# Patient Record
Sex: MALE | Race: BLACK OR AFRICAN AMERICAN | URBAN - METROPOLITAN AREA
[De-identification: names, ages, dates, MRNs, and addresses within clinical notes are randomized per-mention and may not be internally consistent; named-entity substitution may affect disease eponyms.]

---

## 2023-02-19 PROBLEM — M25.511 ACUTE PAIN OF RIGHT SHOULDER: Status: ACTIVE | Noted: 2023-02-19

## 2023-02-19 PROBLEM — E66.3 OVERWEIGHT WITH BODY MASS INDEX (BMI) OF 26 TO 26.9 IN ADULT: Status: ACTIVE | Noted: 2023-02-19

## 2023-02-19 PROBLEM — R03.0 ELEVATED BP WITHOUT DIAGNOSIS OF HYPERTENSION: Status: ACTIVE | Noted: 2023-02-19

## 2023-04-05 ENCOUNTER — APPOINTMENT (OUTPATIENT)
Dept: PRIMARY CARE | Facility: CLINIC | Age: 33
End: 2023-04-05
Payer: COMMERCIAL

## 2023-04-25 ENCOUNTER — OFFICE VISIT (OUTPATIENT)
Dept: PRIMARY CARE | Facility: CLINIC | Age: 33
End: 2023-04-25
Payer: COMMERCIAL

## 2023-04-25 VITALS
BODY MASS INDEX: 29.17 KG/M2 | WEIGHT: 215.4 LBS | SYSTOLIC BLOOD PRESSURE: 132 MMHG | HEIGHT: 72 IN | OXYGEN SATURATION: 98 % | TEMPERATURE: 97.6 F | HEART RATE: 78 BPM | DIASTOLIC BLOOD PRESSURE: 98 MMHG

## 2023-04-25 DIAGNOSIS — Z11.3 SCREEN FOR STD (SEXUALLY TRANSMITTED DISEASE): Primary | ICD-10-CM

## 2023-04-25 PROCEDURE — 87661 TRICHOMONAS VAGINALIS AMPLIF: CPT

## 2023-04-25 PROCEDURE — 99213 OFFICE O/P EST LOW 20 MIN: CPT | Performed by: NURSE PRACTITIONER

## 2023-04-25 PROCEDURE — 87591 N.GONORRHOEAE DNA AMP PROB: CPT

## 2023-04-25 PROCEDURE — 87491 CHLMYD TRACH DNA AMP PROBE: CPT

## 2023-04-25 PROCEDURE — 3008F BODY MASS INDEX DOCD: CPT | Performed by: NURSE PRACTITIONER

## 2023-04-25 PROCEDURE — 1036F TOBACCO NON-USER: CPT | Performed by: NURSE PRACTITIONER

## 2023-04-25 ASSESSMENT — PATIENT HEALTH QUESTIONNAIRE - PHQ9
SUM OF ALL RESPONSES TO PHQ9 QUESTIONS 1 AND 2: 0
1. LITTLE INTEREST OR PLEASURE IN DOING THINGS: NOT AT ALL
2. FEELING DOWN, DEPRESSED OR HOPELESS: NOT AT ALL

## 2023-04-25 ASSESSMENT — PAIN SCALES - GENERAL: PAINLEVEL: 0-NO PAIN

## 2023-04-25 NOTE — PROGRESS NOTES
Subjective   Patient ID: Maximo Pfeiffer is a 32 y.o. male who presents for Annual Exam (STI testing w/physical/).    Presents for STD screening.   Asymptomatic. No known exposures.  Delaying Physical due to not having paperwork in office today for work.    Requesting nutritionist referral. Wants to review diet in related to recent weight gain.           Review of Systems    Objective   BP (!) 132/98 (BP Location: Left arm, Patient Position: Sitting, BP Cuff Size: Large adult)   Pulse 78   Temp 36.4 °C (97.6 °F) (Oral)   Ht 1.829 m (6')   Wt 97.7 kg (215 lb 6.4 oz)   SpO2 98%   BMI 29.21 kg/m²     Physical Exam  Vitals and nursing note reviewed.   Constitutional:       General: He is not in acute distress.     Appearance: Normal appearance.   Cardiovascular:      Rate and Rhythm: Normal rate and regular rhythm.      Pulses: Normal pulses.      Heart sounds: Normal heart sounds. No murmur heard.  Pulmonary:      Effort: Pulmonary effort is normal. No respiratory distress.      Breath sounds: Normal breath sounds.         Assessment/Plan

## 2023-04-25 NOTE — PATIENT INSTRUCTIONS
Nutrition referral placed. Call scheduling to make your appointment.    Fasting blood work and STD screening today.    Reschedule work Physical.

## 2023-04-26 LAB
CHLAMYDIA TRACH., AMPLIFIED: NEGATIVE
N. GONORRHEA, AMPLIFIED: NEGATIVE
TRICHOMONAS VAGINALIS: NEGATIVE

## 2023-05-23 ENCOUNTER — OFFICE VISIT (OUTPATIENT)
Dept: PRIMARY CARE | Facility: CLINIC | Age: 33
End: 2023-05-23
Payer: COMMERCIAL

## 2023-05-23 ENCOUNTER — APPOINTMENT (OUTPATIENT)
Dept: LAB | Facility: LAB | Age: 33
End: 2023-05-23
Payer: COMMERCIAL

## 2023-05-23 VITALS
HEIGHT: 72 IN | BODY MASS INDEX: 27.66 KG/M2 | HEART RATE: 87 BPM | TEMPERATURE: 98.2 F | DIASTOLIC BLOOD PRESSURE: 80 MMHG | WEIGHT: 204.2 LBS | OXYGEN SATURATION: 97 % | SYSTOLIC BLOOD PRESSURE: 118 MMHG

## 2023-05-23 DIAGNOSIS — Z00.00 ROUTINE GENERAL MEDICAL EXAMINATION AT A HEALTH CARE FACILITY: Primary | ICD-10-CM

## 2023-05-23 PROBLEM — E66.3 OVERWEIGHT WITH BODY MASS INDEX (BMI) OF 26 TO 26.9 IN ADULT: Status: RESOLVED | Noted: 2023-02-19 | Resolved: 2023-05-23

## 2023-05-23 LAB
ALANINE AMINOTRANSFERASE (SGPT) (U/L) IN SER/PLAS: 18 U/L (ref 10–52)
ALBUMIN (G/DL) IN SER/PLAS: 4.5 G/DL (ref 3.4–5)
ALKALINE PHOSPHATASE (U/L) IN SER/PLAS: 61 U/L (ref 33–120)
ANION GAP IN SER/PLAS: 16 MMOL/L (ref 10–20)
ASPARTATE AMINOTRANSFERASE (SGOT) (U/L) IN SER/PLAS: 18 U/L (ref 9–39)
BILIRUBIN TOTAL (MG/DL) IN SER/PLAS: 0.7 MG/DL (ref 0–1.2)
CALCIUM (MG/DL) IN SER/PLAS: 9.7 MG/DL (ref 8.6–10.6)
CARBON DIOXIDE, TOTAL (MMOL/L) IN SER/PLAS: 29 MMOL/L (ref 21–32)
CHLORIDE (MMOL/L) IN SER/PLAS: 103 MMOL/L (ref 98–107)
CHOLESTEROL (MG/DL) IN SER/PLAS: 164 MG/DL (ref 0–199)
CHOLESTEROL IN HDL (MG/DL) IN SER/PLAS: 45.4 MG/DL
CHOLESTEROL/HDL RATIO: 3.6
CREATININE (MG/DL) IN SER/PLAS: 0.95 MG/DL (ref 0.5–1.3)
ERYTHROCYTE DISTRIBUTION WIDTH (RATIO) BY AUTOMATED COUNT: 13.8 % (ref 11.5–14.5)
ERYTHROCYTE MEAN CORPUSCULAR HEMOGLOBIN CONCENTRATION (G/DL) BY AUTOMATED: 31.7 G/DL (ref 32–36)
ERYTHROCYTE MEAN CORPUSCULAR VOLUME (FL) BY AUTOMATED COUNT: 92 FL (ref 80–100)
ERYTHROCYTES (10*6/UL) IN BLOOD BY AUTOMATED COUNT: 4.83 X10E12/L (ref 4.5–5.9)
GFR MALE: >90 ML/MIN/1.73M2
GLUCOSE (MG/DL) IN SER/PLAS: 86 MG/DL (ref 74–99)
HEMATOCRIT (%) IN BLOOD BY AUTOMATED COUNT: 44.2 % (ref 41–52)
HEMOGLOBIN (G/DL) IN BLOOD: 14 G/DL (ref 13.5–17.5)
HIV 1/ 2 AG/AB SCREEN: NONREACTIVE
LDL: 107 MG/DL (ref 0–99)
LEUKOCYTES (10*3/UL) IN BLOOD BY AUTOMATED COUNT: 5.7 X10E9/L (ref 4.4–11.3)
NRBC (PER 100 WBCS) BY AUTOMATED COUNT: 0 /100 WBC (ref 0–0)
PLATELETS (10*3/UL) IN BLOOD AUTOMATED COUNT: 319 X10E9/L (ref 150–450)
POTASSIUM (MMOL/L) IN SER/PLAS: 4.8 MMOL/L (ref 3.5–5.3)
PROTEIN TOTAL: 7.7 G/DL (ref 6.4–8.2)
SODIUM (MMOL/L) IN SER/PLAS: 143 MMOL/L (ref 136–145)
TRIGLYCERIDE (MG/DL) IN SER/PLAS: 59 MG/DL (ref 0–149)
UREA NITROGEN (MG/DL) IN SER/PLAS: 22 MG/DL (ref 6–23)
VLDL: 12 MG/DL (ref 0–40)

## 2023-05-23 PROCEDURE — 86592 SYPHILIS TEST NON-TREP QUAL: CPT

## 2023-05-23 PROCEDURE — 87389 HIV-1 AG W/HIV-1&-2 AB AG IA: CPT

## 2023-05-23 PROCEDURE — 1036F TOBACCO NON-USER: CPT | Performed by: NURSE PRACTITIONER

## 2023-05-23 PROCEDURE — 36415 COLL VENOUS BLD VENIPUNCTURE: CPT

## 2023-05-23 PROCEDURE — 80061 LIPID PANEL: CPT

## 2023-05-23 PROCEDURE — 3008F BODY MASS INDEX DOCD: CPT | Performed by: NURSE PRACTITIONER

## 2023-05-23 PROCEDURE — 80053 COMPREHEN METABOLIC PANEL: CPT

## 2023-05-23 PROCEDURE — 85027 COMPLETE CBC AUTOMATED: CPT

## 2023-05-23 PROCEDURE — 99395 PREV VISIT EST AGE 18-39: CPT | Performed by: NURSE PRACTITIONER

## 2023-05-23 ASSESSMENT — ENCOUNTER SYMPTOMS
FREQUENCY: 0
BACK PAIN: 0
NERVOUS/ANXIOUS: 0
PALPITATIONS: 0
ARTHRALGIAS: 0
HEADACHES: 0
NAUSEA: 0
FATIGUE: 0
DYSURIA: 0
VOMITING: 0
COUGH: 0
SHORTNESS OF BREATH: 0
BLOOD IN STOOL: 0
ABDOMINAL PAIN: 0
MYALGIAS: 0
CONSTIPATION: 0
APPETITE CHANGE: 0
UNEXPECTED WEIGHT CHANGE: 0
DIARRHEA: 0

## 2023-05-23 ASSESSMENT — PATIENT HEALTH QUESTIONNAIRE - PHQ9
1. LITTLE INTEREST OR PLEASURE IN DOING THINGS: NOT AT ALL
SUM OF ALL RESPONSES TO PHQ9 QUESTIONS 1 AND 2: 0
2. FEELING DOWN, DEPRESSED OR HOPELESS: NOT AT ALL

## 2023-05-23 ASSESSMENT — PAIN SCALES - GENERAL: PAINLEVEL: 0-NO PAIN

## 2023-05-23 NOTE — PROGRESS NOTES
lkerSubjective   Maximo Pfeiffer is a 32 y.o. male and is here for a comprehensive physical exam. The patient reports  needs work physical exam .  Fasting for blood work today.      History:  Patient receives prostate care outside our clinic  Date last prostate exam:  NA  Date last PSA:  NA    Colonoscopy: NA    Do you have pain that bothers you in your daily life? no    Review of Systems   Constitutional:  Negative for appetite change, fatigue and unexpected weight change.   HENT:  Negative for congestion, ear pain and hearing loss.    Eyes:  Negative for visual disturbance.   Respiratory:  Negative for cough and shortness of breath.    Cardiovascular:  Negative for chest pain, palpitations and leg swelling.   Gastrointestinal:  Negative for abdominal pain, blood in stool, constipation, diarrhea, nausea and vomiting.   Genitourinary:  Negative for dysuria, frequency and urgency.   Musculoskeletal:  Negative for arthralgias, back pain and myalgias.   Skin:  Negative for rash.   Neurological:  Negative for syncope and headaches.   Psychiatric/Behavioral:  The patient is not nervous/anxious.         Objective   Physical Exam  Vitals and nursing note reviewed.   Constitutional:       General: He is not in acute distress.     Appearance: Normal appearance. He is not toxic-appearing.   HENT:      Head: Normocephalic.      Right Ear: Tympanic membrane, ear canal and external ear normal.      Left Ear: Tympanic membrane, ear canal and external ear normal.      Nose: Nose normal.      Mouth/Throat:      Mouth: Mucous membranes are dry.      Pharynx: Oropharynx is clear.   Eyes:      Conjunctiva/sclera: Conjunctivae normal.   Neck:      Thyroid: No thyromegaly.   Cardiovascular:      Rate and Rhythm: Normal rate and regular rhythm.      Pulses: Normal pulses.      Heart sounds: Normal heart sounds. No murmur heard.  Pulmonary:      Effort: Pulmonary effort is normal. No respiratory distress.      Breath sounds: Normal breath  sounds.   Abdominal:      General: Bowel sounds are normal.      Palpations: Abdomen is soft.      Tenderness: There is no abdominal tenderness.   Musculoskeletal:         General: Normal range of motion.      Cervical back: Neck supple.      Right lower leg: No edema.      Left lower leg: No edema.   Lymphadenopathy:      Cervical: No cervical adenopathy.   Skin:     General: Skin is warm and dry.      Capillary Refill: Capillary refill takes 2 to 3 seconds.      Findings: No rash.   Neurological:      General: No focal deficit present.      Gait: Gait normal.   Psychiatric:         Mood and Affect: Mood normal.         Judgment: Judgment normal.         Assessment/Plan   Healthy male exam.      1. Fasting blood work today.  Work form completed in office today.  2. Patient Counseling:  --Nutrition: Stressed importance of moderation in sodium/caffeine intake, saturated fat and cholesterol, caloric balance, sufficient intake of fresh fruits, vegetables, fiber, calcium, iron, and 1 mg of folate supplement per day (for females capable of pregnancy).  --Discussed the issue of estrogen replacement, calcium supplement, and the daily use of baby aspirin.  --Exercise: Stressed the importance of regular exercise.   --Substance Abuse: Discussed cessation/primary prevention of tobacco, alcohol, or other drug use; driving or other dangerous activities under the influence; availability of treatment for abuse.    --Sexuality: Discussed sexually transmitted diseases, partner selection, use of condoms, avoidance of unintended pregnancy  and contraceptive alternatives.   --Injury prevention: Discussed safety belts, safety helmets, smoke detector, smoking near bedding or upholstery.   --Dental health: Discussed importance of regular tooth brushing, flossing, and dental visits.  --Immunizations reviewed.  --Discussed benefits of screening colonoscopy.  --After hours service discussed with patient  3. Discussed the patient's BMI with  him.  The BMI is above average. The patient received Provided instructions on dietary changes  Provided instructions on exercise  Advised to Increase physical activity because they have an above normal BMI.  4. Follow up next physical in 1 year

## 2023-05-24 LAB — RPR MONITORING: NONREACTIVE

## 2024-04-29 ENCOUNTER — LAB (OUTPATIENT)
Dept: LAB | Facility: LAB | Age: 34
End: 2024-04-29
Payer: COMMERCIAL

## 2024-04-29 ENCOUNTER — APPOINTMENT (OUTPATIENT)
Dept: PRIMARY CARE | Facility: CLINIC | Age: 34
End: 2024-04-29
Payer: COMMERCIAL

## 2024-04-29 ENCOUNTER — OFFICE VISIT (OUTPATIENT)
Dept: PRIMARY CARE | Facility: HOSPITAL | Age: 34
End: 2024-04-29
Payer: COMMERCIAL

## 2024-04-29 VITALS
WEIGHT: 206 LBS | HEIGHT: 72 IN | BODY MASS INDEX: 27.9 KG/M2 | HEART RATE: 79 BPM | OXYGEN SATURATION: 96 % | DIASTOLIC BLOOD PRESSURE: 90 MMHG | TEMPERATURE: 97.1 F | SYSTOLIC BLOOD PRESSURE: 138 MMHG

## 2024-04-29 DIAGNOSIS — Z11.3 ROUTINE SCREENING FOR STI (SEXUALLY TRANSMITTED INFECTION): ICD-10-CM

## 2024-04-29 DIAGNOSIS — R03.0 ELEVATED BLOOD PRESSURE READING WITHOUT DIAGNOSIS OF HYPERTENSION: ICD-10-CM

## 2024-04-29 DIAGNOSIS — Z00.00 ROUTINE GENERAL MEDICAL EXAMINATION AT A HEALTH CARE FACILITY: Primary | ICD-10-CM

## 2024-04-29 DIAGNOSIS — Z00.00 ROUTINE GENERAL MEDICAL EXAMINATION AT A HEALTH CARE FACILITY: ICD-10-CM

## 2024-04-29 LAB
ALBUMIN SERPL BCP-MCNC: 4.3 G/DL (ref 3.4–5)
ALP SERPL-CCNC: 60 U/L (ref 33–120)
ALT SERPL W P-5'-P-CCNC: 15 U/L (ref 10–52)
ANION GAP SERPL CALC-SCNC: 11 MMOL/L (ref 10–20)
APPEARANCE UR: ABNORMAL
AST SERPL W P-5'-P-CCNC: 18 U/L (ref 9–39)
BACTERIA #/AREA URNS AUTO: ABNORMAL /HPF
BILIRUB SERPL-MCNC: 0.5 MG/DL (ref 0–1.2)
BILIRUB UR STRIP.AUTO-MCNC: NEGATIVE MG/DL
BUN SERPL-MCNC: 16 MG/DL (ref 6–23)
CALCIUM SERPL-MCNC: 9.1 MG/DL (ref 8.6–10.3)
CHLORIDE SERPL-SCNC: 103 MMOL/L (ref 98–107)
CHOLEST SERPL-MCNC: 162 MG/DL (ref 0–199)
CHOLESTEROL/HDL RATIO: 3.3
CO2 SERPL-SCNC: 32 MMOL/L (ref 21–32)
COLOR UR: ABNORMAL
CREAT SERPL-MCNC: 0.83 MG/DL (ref 0.5–1.3)
EGFRCR SERPLBLD CKD-EPI 2021: >90 ML/MIN/1.73M*2
GLUCOSE SERPL-MCNC: 93 MG/DL (ref 74–99)
GLUCOSE UR STRIP.AUTO-MCNC: NORMAL MG/DL
HDLC SERPL-MCNC: 49.6 MG/DL
HIV 1+2 AB+HIV1 P24 AG SERPL QL IA: NONREACTIVE
KETONES UR STRIP.AUTO-MCNC: NEGATIVE MG/DL
LDLC SERPL CALC-MCNC: 97 MG/DL
LEUKOCYTE ESTERASE UR QL STRIP.AUTO: NEGATIVE
MUCOUS THREADS #/AREA URNS AUTO: ABNORMAL /LPF
NITRITE UR QL STRIP.AUTO: NEGATIVE
NON HDL CHOLESTEROL: 112 MG/DL (ref 0–149)
PH UR STRIP.AUTO: 6 [PH]
POTASSIUM SERPL-SCNC: 4.5 MMOL/L (ref 3.5–5.3)
PROT SERPL-MCNC: 7.2 G/DL (ref 6.4–8.2)
PROT UR STRIP.AUTO-MCNC: ABNORMAL MG/DL
RBC # UR STRIP.AUTO: NEGATIVE /UL
RBC #/AREA URNS AUTO: ABNORMAL /HPF
SODIUM SERPL-SCNC: 141 MMOL/L (ref 136–145)
SP GR UR STRIP.AUTO: 1.03
TREPONEMA PALLIDUM IGG+IGM AB [PRESENCE] IN SERUM OR PLASMA BY IMMUNOASSAY: NONREACTIVE
TRIGL SERPL-MCNC: 78 MG/DL (ref 0–149)
UROBILINOGEN UR STRIP.AUTO-MCNC: NORMAL MG/DL
VLDL: 16 MG/DL (ref 0–40)
WBC #/AREA URNS AUTO: ABNORMAL /HPF

## 2024-04-29 PROCEDURE — 99395 PREV VISIT EST AGE 18-39: CPT | Performed by: INTERNAL MEDICINE

## 2024-04-29 PROCEDURE — 87661 TRICHOMONAS VAGINALIS AMPLIF: CPT

## 2024-04-29 PROCEDURE — 80061 LIPID PANEL: CPT

## 2024-04-29 PROCEDURE — 81001 URINALYSIS AUTO W/SCOPE: CPT

## 2024-04-29 PROCEDURE — 87800 DETECT AGNT MULT DNA DIREC: CPT

## 2024-04-29 PROCEDURE — 86780 TREPONEMA PALLIDUM: CPT

## 2024-04-29 PROCEDURE — 36415 COLL VENOUS BLD VENIPUNCTURE: CPT

## 2024-04-29 PROCEDURE — 90715 TDAP VACCINE 7 YRS/> IM: CPT | Performed by: INTERNAL MEDICINE

## 2024-04-29 PROCEDURE — 1036F TOBACCO NON-USER: CPT | Performed by: INTERNAL MEDICINE

## 2024-04-29 PROCEDURE — 3008F BODY MASS INDEX DOCD: CPT | Performed by: INTERNAL MEDICINE

## 2024-04-29 PROCEDURE — 80053 COMPREHEN METABOLIC PANEL: CPT

## 2024-04-29 PROCEDURE — 87389 HIV-1 AG W/HIV-1&-2 AB AG IA: CPT

## 2024-04-29 NOTE — ASSESSMENT & PLAN NOTE
Blood pressure is elevated.  That was addressed separately.  Fasting blood sugar and fasting lipid profile will be obtained now.  Testicular self-exam advised.  Referred for eye exam.  Tdap to be updated today.  STI screening will be updated today.     We discussed that Maximo can improve his health and lower risk for chronic disease by making healthy lifestyle changes.  I recommended 30 min of moderate intensity aerobic exercise 5 days per week and 2 days of muscle building exercises.  We reviewed the elements of a healthy plant-based, whole-food diet, and Maximo was provided online resources to help with meal planning.  I encouraged Maximo to get at least 7 hours of restful sleep at night and to work on a method for managing stress. I provided online resources to help with self-care.   I recommended 30 min of moderate intensity aerobic exercise 5 days per week and 2 days of muscle building exercises.  We reviewed the elements of a healthy plant-based, whole-food diet, and Maximo was provided online resources to help with meal planning.  I encouraged Maximo to get at least 7 hours of restful sleep at night and to work on a method for managing stress. I provided online resources to help with self-care.

## 2024-04-29 NOTE — ASSESSMENT & PLAN NOTE
Blood pressure is elevated, we discussed a goal for his blood pressure of less than 130 systolic and less than 80 diastolic.  Comprehensive metabolic panel and urinalysis will be obtained now.  Eye exam recommended.  We discussed the elements of a DASH diet, and Maximo was provided printed materials to help with meal planning.  He is interested in meeting with a dietitian and a referral was placed today.

## 2024-04-29 NOTE — PROGRESS NOTES
Chief Complaint   Patient presents with    Annual Exam     WELLNESS FORM         History Of Present Illness:    Maximo Pfeiffer is a 33 y.o. male presenting to update health maintenance and have blood pressure checked.  Maximo has a history of borderline hypertension otherwise healthy who presents for his yearly physical.  He is sexually active and is interested in updating STI screening.  He has gained some weight over the time, he does shift work, and is interested in tips to improve his diet and help him to lose weight and lower blood pressure.  He is in his usual state of health.  Energy and physical endurance are good.  He denies headaches dizziness or vision changes.  He denies any change in bowel or bladder habits.  He has not had a recent eye exam, and he is due for Tdap vaccine.        Active Medical Problems:  Patient Active Problem List    Diagnosis Date Noted    Routine general medical examination at a health care facility 04/29/2024    Elevated blood pressure reading without diagnosis of hypertension 02/19/2023    Acute pain of right shoulder 02/19/2023       Past Medical History:  Past Medical History:   Diagnosis Date    Chlamydial infection, unspecified 12/26/2014    Chlamydia    Elevated blood pressure reading without diagnosis of hypertension     Personal history of other diseases of the nervous system and sense organs 07/23/2016    History of acute otitis media    Personal history of other diseases of the respiratory system 10/29/2015    History of sore throat    Personal history of other specified conditions 12/23/2014    History of urinary frequency       Past Surgical History:  Past Surgical History:   Procedure Laterality Date    REFRACTIVE SURGERY      WISDOM TOOTH EXTRACTION           Social History:  Social History     Tobacco Use    Smoking status: Never     Passive exposure: Never    Smokeless tobacco: Never   Vaping Use    Vaping status: Never Used   Substance Use Topics    Alcohol use: Yes      Comment: Socially    Drug use: Never         Family History:  Family History   Problem Relation Name Age of Onset    No Known Problems Mother      Stroke Father          smoker    Hypertension Father      No Known Problems Sister      Hypertension Brother      Diabetes Maternal Grandmother      Hypertension Maternal Grandmother          Allergies:  Patient has no known allergies.    Outpatient Medications:  No current outpatient medications on file.    Review of Systems:  Pertinent positives in review of systems outlined above.  Complete ROS otherwise negative.           Last Recorded Vitals:  Vitals:    04/29/24 0814 04/29/24 0837   BP: (!) 135/106 138/90   BP Location: Left arm    Patient Position: Sitting    BP Cuff Size: Large adult    Pulse: 79    Temp: 36.2 °C (97.1 °F)    SpO2: 96%    Weight: 93.4 kg (206 lb)    Height: 1.829 m (6')    Body mass index is 27.94 kg/m².        Physical Exam  Vitals reviewed.   Constitutional:       Appearance: Normal appearance.   HENT:      Mouth/Throat:      Pharynx: Oropharynx is clear.   Eyes:      Extraocular Movements: Extraocular movements intact.      Conjunctiva/sclera: Conjunctivae normal.      Pupils: Pupils are equal, round, and reactive to light.   Neck:      Vascular: No carotid bruit.   Cardiovascular:      Rate and Rhythm: Normal rate and regular rhythm.   Pulmonary:      Effort: Pulmonary effort is normal.      Breath sounds: Normal breath sounds.   Abdominal:      General: Abdomen is flat. Bowel sounds are normal.      Palpations: Abdomen is soft. There is no mass.      Tenderness: There is no abdominal tenderness. There is no right CVA tenderness or left CVA tenderness.   Musculoskeletal:      Cervical back: No tenderness.      Right lower leg: No edema.      Left lower leg: No edema.   Lymphadenopathy:      Cervical: No cervical adenopathy.   Neurological:      General: No focal deficit present.      Mental Status: He is alert and oriented to person,  place, and time.   Psychiatric:         Mood and Affect: Mood normal.            Problem List Items Addressed This Visit       Elevated blood pressure reading without diagnosis of hypertension     Blood pressure is elevated, we discussed a goal for his blood pressure of less than 130 systolic and less than 80 diastolic.  Comprehensive metabolic panel and urinalysis will be obtained now.  Eye exam recommended.  We discussed the elements of a DASH diet, and Maximo was provided printed materials to help with meal planning.  He is interested in meeting with a dietitian and a referral was placed today.           Relevant Orders    Referral to Nutrition Services    Comprehensive Metabolic Panel    Urinalysis with Reflex Microscopic    Routine general medical examination at a health care facility - Primary     Blood pressure is elevated.  That was addressed separately.  Fasting blood sugar and fasting lipid profile will be obtained now.  Testicular self-exam advised.  Referred for eye exam.  Tdap to be updated today.  STI screening will be updated today.     We discussed that Maximo can improve his health and lower risk for chronic disease by making healthy lifestyle changes.  I recommended 30 min of moderate intensity aerobic exercise 5 days per week and 2 days of muscle building exercises.  We reviewed the elements of a healthy plant-based, whole-food diet, and Maximo was provided online resources to help with meal planning.  I encouraged Maximo to get at least 7 hours of restful sleep at night and to work on a method for managing stress. I provided online resources to help with self-care.   I recommended 30 min of moderate intensity aerobic exercise 5 days per week and 2 days of muscle building exercises.  We reviewed the elements of a healthy plant-based, whole-food diet, and Maximo was provided online resources to help with meal planning.  I encouraged Maximo to get at least 7 hours of restful sleep at night and to work on  a method for managing stress. I provided online resources to help with self-care.            Relevant Orders    Referral to Ophthalmology    Comprehensive Metabolic Panel    Lipid Panel     Other Visit Diagnoses       Routine screening for STI (sexually transmitted infection)        Relevant Orders    C. Trachomatis / N. Gonorrhoeae, Amplified Detection    HIV 1/2 Antigen/Antibody Screen with Reflex to Confirmation    Syphilis Screen with Reflex    Trichomonas vaginalis, Nucleic Acid Detection            Dereje Ryan MD

## 2024-04-29 NOTE — PATIENT INSTRUCTIONS
Fasting blood work now     Schedule eye exam     Schedule visit with dietician     Exercise for 30 min 5 d per week, don't sit for more than an hour    Sleep for 7 hours     Plant based, whole food - 5 servings of fruits and veg per day, 35 grams per day   Avoid: red meat, processed meat (deli,cured), saturated fat, sugar   Limit intake of salt - 1500mg per day     Movies on [employer]:  The Game Changers  Blue Zones  You Are What You Eat  What the Health  [place] Over Athletic Standard    Web Sites for Recipes:  Blue Zones  [place] Over Knives  Plant Strong   Nutritionfacts.org     Cookbooks:  The Get Healthy, Go Vegan Cookbook: 125 Easy and Delicious Recipes to Jump-Start Weight Loss and Help you Feel Great - Dr. Homero Lewis's Cookbook for Reversing Diabetes:  150 Recipes Scientifically Proven to Reverse Diabetes Without Drugs - Dr. Homero Lewis  The Prevent and Reverse Heart Disease Cookbook - Dr. Michelle Garcia   The [place] Study All-Star Collection:  Whole Food, Plant-Based Recipes from Your Favorite Vegan  - Dr. PRAVEEN Dumont  How Not To Die - Dr. Surya Mckeon

## 2024-04-30 LAB
C TRACH RRNA SPEC QL NAA+PROBE: NEGATIVE
N GONORRHOEA DNA SPEC QL PROBE+SIG AMP: NEGATIVE
T VAGINALIS RRNA SPEC QL NAA+PROBE: NEGATIVE

## 2025-02-04 ENCOUNTER — APPOINTMENT (OUTPATIENT)
Dept: PRIMARY CARE | Facility: CLINIC | Age: 35
End: 2025-02-04
Payer: COMMERCIAL

## 2025-02-04 VITALS
WEIGHT: 217 LBS | DIASTOLIC BLOOD PRESSURE: 103 MMHG | BODY MASS INDEX: 29.39 KG/M2 | HEIGHT: 72 IN | SYSTOLIC BLOOD PRESSURE: 158 MMHG | HEART RATE: 80 BPM | TEMPERATURE: 98.5 F

## 2025-02-04 DIAGNOSIS — G89.29 CHRONIC RIGHT HIP PAIN: ICD-10-CM

## 2025-02-04 DIAGNOSIS — Z20.2 POSSIBLE EXPOSURE TO STI: ICD-10-CM

## 2025-02-04 DIAGNOSIS — R03.0 ELEVATED BLOOD PRESSURE READING WITHOUT DIAGNOSIS OF HYPERTENSION: ICD-10-CM

## 2025-02-04 DIAGNOSIS — M25.551 CHRONIC RIGHT HIP PAIN: ICD-10-CM

## 2025-02-04 DIAGNOSIS — Z11.3 SCREEN FOR STD (SEXUALLY TRANSMITTED DISEASE): Primary | ICD-10-CM

## 2025-02-04 PROCEDURE — 3008F BODY MASS INDEX DOCD: CPT | Performed by: STUDENT IN AN ORGANIZED HEALTH CARE EDUCATION/TRAINING PROGRAM

## 2025-02-04 PROCEDURE — 99214 OFFICE O/P EST MOD 30 MIN: CPT | Performed by: STUDENT IN AN ORGANIZED HEALTH CARE EDUCATION/TRAINING PROGRAM

## 2025-02-04 PROCEDURE — 1036F TOBACCO NON-USER: CPT | Performed by: STUDENT IN AN ORGANIZED HEALTH CARE EDUCATION/TRAINING PROGRAM

## 2025-02-04 ASSESSMENT — ENCOUNTER SYMPTOMS
RHINORRHEA: 0
ABDOMINAL PAIN: 0
UNEXPECTED WEIGHT CHANGE: 0
HEMATURIA: 0
LIGHT-HEADEDNESS: 0
CHILLS: 0
FEVER: 0
SHORTNESS OF BREATH: 0
DIZZINESS: 0
EYE PAIN: 0

## 2025-02-04 NOTE — ASSESSMENT & PLAN NOTE
-BP Goal <130/80.  -Blood pressure is elevated, we discussed a goal for his blood pressure of less than 130 systolic and less than 80 diastolic.  Comprehensive metabolic panel and urinalysis will be obtained now.  Eye exam recommended.  We discussed the elements of a DASH diet, and Maximo was provided printed materials to help with meal planning.  He is interested in meeting with a dietitian and a referral was placed today.  -Patient will take home BP readings and log them.  He will bring the log and his blood pressure cuff to the next appointment.

## 2025-02-04 NOTE — PROGRESS NOTES
Subjective     Patient ID: Maximo Pfeiffer is a 34 y.o. male who presents today for constellation of symptoms and work forms to be filled out.      Initial PCP history 2//2025.  This is the first time meeting the patient.  Patient went to urgent care in October for chest pain.  He was treated with NSAIDs for possible costochondritis.  It was pleuritic chest pain on the left side does exacerbated with breathing.  He has not had episodes since.  Patient's father passed away at the age of 70 due to presumed heart disease.  Patient's brother is being treated for high blood pressure.  Patient complains of right hip pain that radiates to the groin that occurs occasionally during physical activities.    Tobacco/Alcohol/Drug Use:   Social History     Tobacco Use    Smoking status: Never     Passive exposure: Never    Smokeless tobacco: Never   Substance Use Topics    Alcohol use: Yes     Comment: Socially       Required Screenings/Immunizations:   Health Maintenance Due   Topic Date Due    Varicella Vaccines (1 of 2 - 13+ 2-dose series) Never done    Hepatitis C Screening  Never done    Hepatitis B Vaccines (1 of 3 - 19+ 3-dose series) Never done    Influenza Vaccine (1) Never done    COVID-19 Vaccine (4 - 2024-25 season) 09/01/2024       Problems to be addressed today in addition to Preventative Services: As stated in orders.     Review of Systems   Constitutional:  Negative for chills, fever and unexpected weight change.   HENT:  Negative for rhinorrhea.    Eyes:  Negative for pain.   Respiratory:  Negative for shortness of breath.    Cardiovascular:  Negative for chest pain.   Gastrointestinal:  Negative for abdominal pain.   Genitourinary:  Negative for hematuria.   Skin:  Negative for rash.   Neurological:  Negative for dizziness, syncope and light-headedness.   Psychiatric/Behavioral:  Negative for behavioral problems and suicidal ideas.        BP (!) 158/103   Pulse 80   Temp 36.9 °C (98.5 °F) (Oral)   Ht 1.829 m  (6')   Wt 98.4 kg (217 lb)   BMI 29.43 kg/m²      Objective   Physical Exam  Vitals reviewed.   Constitutional:       General: He is not in acute distress.  HENT:      Head: Normocephalic.      Right Ear: External ear normal.      Left Ear: External ear normal.      Nose: No rhinorrhea.      Mouth/Throat:      Mouth: Mucous membranes are moist.   Eyes:      Conjunctiva/sclera: Conjunctivae normal.   Cardiovascular:      Rate and Rhythm: Normal rate and regular rhythm.      Heart sounds: No murmur heard.     No friction rub. No gallop.   Pulmonary:      Effort: No respiratory distress.      Breath sounds: No wheezing, rhonchi or rales.   Abdominal:      General: Bowel sounds are normal. There is no distension.      Palpations: Abdomen is soft.      Tenderness: There is no abdominal tenderness.   Musculoskeletal:      Cervical back: Neck supple.      Right lower leg: No edema.      Left lower leg: No edema.   Skin:     General: Skin is warm and dry.      Capillary Refill: Capillary refill takes less than 2 seconds.   Neurological:      Mental Status: He is alert.      Gait: Gait normal.           Labs:   Lab Results   Component Value Date    WBC 5.7 05/23/2023    HGB 14.0 05/23/2023    HCT 44.2 05/23/2023     05/23/2023     Lab Results   Component Value Date     04/29/2024    K 4.5 04/29/2024     04/29/2024    BUN 16 04/29/2024    CREATININE 0.83 04/29/2024    GLUCOSE 93 04/29/2024    CALCIUM 9.1 04/29/2024    PROT 7.2 04/29/2024    BILITOT 0.5 04/29/2024    ALKPHOS 60 04/29/2024    AST 18 04/29/2024    ALT 15 04/29/2024     Lab Results   Component Value Date    CHOL 162 04/29/2024    CHOL 164 05/23/2023      Lab Results   Component Value Date    TRIG 78 04/29/2024    TRIG 59 05/23/2023      Lab Results   Component Value Date    HDL 49.6 04/29/2024    HDL 45.4 05/23/2023     Lab Results   Component Value Date    LDLCALC 97 04/29/2024      Lab Results   Component Value Date    VLDL 16 04/29/2024     "VLDL 12 05/23/2023    No components found for: \"[id number]\"    Imaging/Testing: No image results found.      Problem List Items Addressed This Visit          Medium    Elevated blood pressure reading without diagnosis of hypertension    Current Assessment & Plan     -BP Goal <130/80.  -Blood pressure is elevated, we discussed a goal for his blood pressure of less than 130 systolic and less than 80 diastolic.  Comprehensive metabolic panel and urinalysis will be obtained now.  Eye exam recommended.  We discussed the elements of a DASH diet, and Maximo was provided printed materials to help with meal planning.  He is interested in meeting with a dietitian and a referral was placed today.  -Patient will take home BP readings and log them.  He will bring the log and his blood pressure cuff to the next appointment.           Chronic right hip pain    Current Assessment & Plan     -Will need x-ray to rule out if joint effects.  -Continue to monitor for now.         Relevant Orders    XR hip right with pelvis when performed 2 or 3 views     Other Visit Diagnoses       Screen for STD (sexually transmitted disease)    -  Primary    Relevant Orders    Hepatitis C Antibody    HIV 1/2 Antigen/Antibody Screen with Reflex to Confirmation    Syphilis Screen with Reflex    Possible exposure to STI        Relevant Orders    C. trachomatis / N. gonorrhoeae, Amplified, Urogenital    Trichomonas vaginalis, Amplified    BMI 30.0-30.9,adult        Relevant Orders    Referral to Nutrition Services              As part of today's Preventative Visit, an age and gender-appropriate history and physical was performed, as documented below. Counseling and anticipatory guidance were performed, and risk factor reduction interventions (including [place] Preventative Services Task Force recommended screening tests) were utilized/ordered as outlined in the above Assessment and Plan. All patient medications were reviewed, and refilled if " necessary.    Patient and I discussed diet/nutrition, lifestyle modifications, safety, medication indications and side effects, and health goals.    current treatment plan is effective, no change in therapy, orders and follow up as documented in EMR, lab results reviewed with patient, repeat labs ordered prior to next appointment, reviewed compliance with lifestyle measures, reviewed diet, exercise and weight control     Return to clinic May 2025 for CPE and blood pressure follow-up.      I have reviewed OARRS report, consistent with prescribed medication. I have considered risks of abuse, diversion, side effects and feel clinically benefit of medication outweighs risks at this time.     I spent 30 minutes in duration for this visit today. This time consisted of chart review, obtaining history, and/or performing the exam as documented above as well as documenting the clinical information for the encounter in the electronic record, discussing treatment options, plans, and/or goals with patient, family, and/or caregiver, refilling medications, updating the electronic record, ordering medicines, lab work, imaging, referrals, and/or procedures as documented above and communicating with other [facility] professionals.

## 2025-02-06 LAB
C TRACH RRNA SPEC QL NAA+PROBE: NOT DETECTED
HCV AB SERPL QL IA: NORMAL
HIV 1+2 AB+HIV1 P24 AG SERPL QL IA: NORMAL
N GONORRHOEA RRNA SPEC QL NAA+PROBE: NOT DETECTED
QUEST GC CT AMPLIFIED (ALWAYS MESSAGE): NORMAL
T PALLIDUM AB SER QL IA: NEGATIVE
T VAGINALIS RRNA SPEC QL NAA+PROBE: NOT DETECTED

## 2025-05-02 ENCOUNTER — PATIENT OUTREACH (OUTPATIENT)
Dept: CARE COORDINATION | Facility: CLINIC | Age: 35
End: 2025-05-02
Payer: COMMERCIAL

## 2025-05-02 NOTE — PROGRESS NOTES
Called Maximo in regard to nutrition referral and we were able to schedule an appointment for next week.

## 2025-05-06 ENCOUNTER — APPOINTMENT (OUTPATIENT)
Dept: CARE COORDINATION | Facility: CLINIC | Age: 35
End: 2025-05-06
Payer: COMMERCIAL

## 2025-05-06 NOTE — PROGRESS NOTES
INITIAL NUTRITION EDUCATION VISIT    Reason for Nutrition Visit:  Pt is a 34 y.o. male being seen Virtual referred for weight management    Past Medical Hx:  Problem List[1]     Current Medications:   Medications Ordered Prior to Encounter[2]    Food & Nutrition Related History:  Dietary Considerations: looking for ways to improve diet to help with overall health and wt loss with shift work  Food Preparation: Patient  Cooking Skills/Barriers: None reported  Grocery Shopping: Patient    24 Diet Recall:  Meal 1: day shift/night shift is opposite (6:30 am) frozen breakfast sandwich or homemade one on night shift  Meal 2: day shift (10-12pm ) packs lunch pan fried chicken with rice and gravy  Meal 3: day shift (6:30-7:30pm) same as lunch or makes tacos    Snacks:  candy, trying to eat glazed nut mix/looking for ideas here  Beverages: was drinking regular pop changed to diet and likes flavored calorie free sparkling water, juice with ice, water  Eating out:  cooks mostly at home  Alcohol Intake: did not ask  Self-Identified Challenges: eating when bored, working shifts can make it more challenging to cook at home    Physical Activity: did not address today    Labs:  Lab Results   Component Value Date     04/29/2024    K 4.5 04/29/2024     04/29/2024    CO2 32 04/29/2024    BUN 16 04/29/2024    CREATININE 0.83 04/29/2024    CALCIUM 9.1 04/29/2024    ALBUMIN 4.3 04/29/2024    PROT 7.2 04/29/2024    BILITOT 0.5 04/29/2024    ALKPHOS 60 04/29/2024    ALT 15 04/29/2024    AST 18 04/29/2024    GLUCOSE 93 04/29/2024     Lab Results   Component Value Date    CHOL 162 04/29/2024    LDLCALC 97 04/29/2024    TRIG 78 04/29/2024    HDL 49.6 04/29/2024    LDLF 107 (H) 05/23/2023        Comments:     CMP:    Lipid panel:    Vitamin D:    A1C:      Anthropometrics:   Ht Readings from Last 1 Encounters:   02/04/25 1.829 m (6')      BMI Readings from Last 1 Encounters:   02/04/25 29.43 kg/m²      Wt Readings from Last 10  Encounters:   02/04/25 98.4 kg (217 lb)   04/29/24 93.4 kg (206 lb)   05/23/23 92.6 kg (204 lb 3.2 oz)   04/25/23 97.7 kg (215 lb 6.4 oz)   04/01/22 88.9 kg (196 lb)      Weight change:  Maximo states he is at his highest weight and he finds he is less comfortable. His goal is to get to 185-195 pounds to feel his best and prevent health issues down the line    Visit Summary   Maximo shared that he noticed his weight creep up and he is at the highest he's been. He is very health conscious and finds with shift work it is harder to maintain his weight. He cooks at home which is great and he likes fruits and vegetables. He has made changes such as air frying vs frying, switching to diet pop as he used to drink regular, and he is working to eat a healthier snack vs candy. Today, education on the healthy plate method and healthy snack components was provided. We looked at some recipes on eatingwell.com to help him with increasing variety and getting more vegetables/legumes at meals. Educational Materials sent to his email.       Nutrition Diagnosis:  Diagnosis Statement 1:  Diagnosis Status: New  Diagnosis : Unintended weight gain related to shift work and food and nutrition knowledge deficit as evidenced by need to find healthier snacks and learn more about nutrition      Nutrition Interventions:  Provided nutrition education on the following topic(s): Increased Fiber, Limited Added Sugars, and Plate Method using ACONutritionCounseling: Motivational Interviewing  Provided handouts on: Healthy Plate, recipe site, snack idea sheet, Mediterranean pattern of eating basics    Nutrition Goals:  Nutrition Goals: Weight Loss  Vegetables: Increase fruits in place of juice  Nutrition Goals: Sugary Drinks: decrease  Nutrition Goals: Whole Grains: Increase  Plate method  Increase fiber    Follow up Plan  Will follow-up x 1 mo          [1]   Patient Active Problem List  Diagnosis    Elevated blood pressure reading without diagnosis of  hypertension    Acute pain of right shoulder    Routine general medical examination at a [facility] facility    Chronic right hip pain   [2]   No current outpatient medications on file prior to visit.     No current facility-administered medications on file prior to visit.

## 2025-05-07 ENCOUNTER — APPOINTMENT (OUTPATIENT)
Dept: PRIMARY CARE | Facility: CLINIC | Age: 35
End: 2025-05-07
Payer: COMMERCIAL

## 2025-05-29 ENCOUNTER — APPOINTMENT (OUTPATIENT)
Dept: PRIMARY CARE | Facility: CLINIC | Age: 35
End: 2025-05-29
Payer: COMMERCIAL

## 2025-06-04 ENCOUNTER — APPOINTMENT (OUTPATIENT)
Dept: CARE COORDINATION | Facility: CLINIC | Age: 35
End: 2025-06-04
Payer: COMMERCIAL

## 2025-06-04 NOTE — PROGRESS NOTES
Nutrition Follow-up   Dietitian 1 mo  follow-up. Pt is being seen Virtual for weight management/ healthy diet    Labs  Lab Results   Component Value Date     04/29/2024    K 4.5 04/29/2024     04/29/2024    CO2 32 04/29/2024    BUN 16 04/29/2024    CREATININE 0.83 04/29/2024    CALCIUM 9.1 04/29/2024    ALBUMIN 4.3 04/29/2024    PROT 7.2 04/29/2024    BILITOT 0.5 04/29/2024    ALKPHOS 60 04/29/2024    ALT 15 04/29/2024    AST 18 04/29/2024    GLUCOSE 93 04/29/2024       Lab Results   Component Value Date    CHOL 162 04/29/2024    LDLCALC 97 04/29/2024    TRIG 78 04/29/2024    HDL 49.6 04/29/2024    LDLF 107 (H) 05/23/2023          Comments:     CMP:    Lipid panel:    Vitamin D:    A1C:      Anthropometrics  Ht Readings from Last 1 Encounters:   02/04/25 1.829 m (6')       BMI Readings from Last 1 Encounters:   02/04/25 29.43 kg/m²       Wt Readings from Last 10 Encounters:   02/04/25 98.4 kg (217 lb)   04/29/24 93.4 kg (206 lb)   05/23/23 92.6 kg (204 lb 3.2 oz)   04/25/23 97.7 kg (215 lb 6.4 oz)   04/01/22 88.9 kg (196 lb)       Weight change:       Visit Summary  Maximo shared that he has been using the healthy plate as a guide for planning his lunch and dinner meals. He added broccoli to his meal and reduced his portion of chicken. He then takes that to lunch for the next day as well. He is eating more of the nut mix snack vs candy bars. Reminded him to keep portions small as even healthy snacks can add up calories quickly. He mostly does sugar free pop or zero calorie beverage, but will have a regular pop once in awhile. Encouraged water mainly throughout the day or other sugar free beverage to keep calories lower and prevent snacking when really thirsty or dehydrated vs being hungry. Maximo did find the recipe site helpful, but he did not get a chance to try any new recipes. He is looking for structure and wants to get a structured workout going first. Maximo plans to find someone to help him put  together a good workout program. In the meantime, we looked at what the guidelines are for strength training and cardio. Also looked at some getting started basic strength exercises he can try as long as he can do them safely. Sent these to his email and also a link to the [employer] as they have demonstrations on how to safely do a variety of exercises as well. Encouraged addition of beans and/or chick peas at meals to provide fiber/nutrients/fullness. Suggested ideas to add variety by using different lean meats or fish, different vegetables, crockpot or sheet pan meals.       Nutrition Diagnosis:  Diagnosis Statement 1:  Diagnosis Status: New - progressing  Diagnosis : Unintended weight gain related to shift work and food and nutrition knowledge deficit as evidenced by need to find healthier snacks and learn more about nutrition    Nutrition Goals    Continue using the healthy plate as your guide for meals   2.   Try adding some chick peas or beans (if canned, rinse well to reduce sodium) to salads or vegetables or as part of a crockpot meal for more fiber and variety  3.   Look into a fitness plan that you can do consistently and enjoy    Follow up Plan  Will follow-up x 3 months

## 2025-06-16 ENCOUNTER — APPOINTMENT (OUTPATIENT)
Dept: PRIMARY CARE | Facility: CLINIC | Age: 35
End: 2025-06-16
Payer: COMMERCIAL

## 2025-07-24 ENCOUNTER — APPOINTMENT (OUTPATIENT)
Dept: PRIMARY CARE | Facility: CLINIC | Age: 35
End: 2025-07-24
Payer: COMMERCIAL

## 2025-08-26 ENCOUNTER — APPOINTMENT (OUTPATIENT)
Dept: CARE COORDINATION | Facility: CLINIC | Age: 35
End: 2025-08-26
Payer: COMMERCIAL

## 2025-08-28 ENCOUNTER — APPOINTMENT (OUTPATIENT)
Dept: PRIMARY CARE | Facility: CLINIC | Age: 35
End: 2025-08-28
Payer: COMMERCIAL

## 2025-11-04 ENCOUNTER — APPOINTMENT (OUTPATIENT)
Dept: CARE COORDINATION | Facility: CLINIC | Age: 35
End: 2025-11-04
Payer: COMMERCIAL